# Patient Record
Sex: FEMALE | Race: BLACK OR AFRICAN AMERICAN | NOT HISPANIC OR LATINO | URBAN - METROPOLITAN AREA
[De-identification: names, ages, dates, MRNs, and addresses within clinical notes are randomized per-mention and may not be internally consistent; named-entity substitution may affect disease eponyms.]

---

## 2022-01-18 ENCOUNTER — EMERGENCY (EMERGENCY)
Facility: HOSPITAL | Age: 67
LOS: 1 days | Discharge: HOME | End: 2022-01-18
Attending: STUDENT IN AN ORGANIZED HEALTH CARE EDUCATION/TRAINING PROGRAM | Admitting: STUDENT IN AN ORGANIZED HEALTH CARE EDUCATION/TRAINING PROGRAM
Payer: MEDICARE

## 2022-01-18 VITALS
WEIGHT: 147.93 LBS | TEMPERATURE: 99 F | HEART RATE: 89 BPM | HEIGHT: 60 IN | SYSTOLIC BLOOD PRESSURE: 150 MMHG | OXYGEN SATURATION: 99 % | RESPIRATION RATE: 16 BRPM | DIASTOLIC BLOOD PRESSURE: 87 MMHG

## 2022-01-18 VITALS
HEART RATE: 88 BPM | OXYGEN SATURATION: 100 % | SYSTOLIC BLOOD PRESSURE: 126 MMHG | TEMPERATURE: 98 F | DIASTOLIC BLOOD PRESSURE: 77 MMHG | RESPIRATION RATE: 20 BRPM

## 2022-01-18 LAB — SARS-COV-2 RNA SPEC QL NAA+PROBE: SIGNIFICANT CHANGE UP

## 2022-01-18 PROCEDURE — 99284 EMERGENCY DEPT VISIT MOD MDM: CPT

## 2022-01-18 RX ORDER — ACETAMINOPHEN 500 MG
650 TABLET ORAL ONCE
Refills: 0 | Status: COMPLETED | OUTPATIENT
Start: 2022-01-18 | End: 2022-01-18

## 2022-01-18 RX ADMIN — Medication 650 MILLIGRAM(S): at 14:40

## 2022-01-18 NOTE — ED ADULT NURSE NOTE - NSIMPLEMENTINTERV_GEN_ALL_ED
Implemented All Universal Safety Interventions:  [place] to call system. Call bell, personal items and telephone within reach. Instruct patient to call for assistance. Room bathroom lighting operational. Non-slip footwear when patient is off stretcher. Physically safe environment: no spills, clutter or unnecessary equipment. Stretcher in lowest position, wheels locked, appropriate side rails in place.

## 2022-01-18 NOTE — ED PROVIDER NOTE - ATTENDING CONTRIBUTION TO CARE
I performed a face-to-face evaluation of the patient and performed a history and physical examination. I agree with the history and physical examination. If this was a PA visit, I personally saw the patient with the PA and performed a substantive portion of the visit including all aspects of the medical decision making.    65yo F presents for possible covid exposure. pt states her daughter had a fever and is being seen in ED so wants a swab. also complains of headache on right side since yesterday, has had similar headaches in past, no assoc fevres, chills, nausea, ovmiting, vision changes, not worst headache or sudden onset  pt well appearing, neuro inact, no facial droop. motor and sensory intactr, normal gait,  will give tylenol, covid swab, dc

## 2022-01-18 NOTE — ED PROVIDER NOTE - NSFOLLOWUPINSTRUCTIONS_ED_ALL_ED_FT
There are no signs of emergency conditions requiring admission to the hospital on today's workup.  Based on the evaluation, a presumptive diagnosis was made, however, further evaluation may be required by your primary care physician or a specialist for a more definitive diagnosis.  Therefore, please follow-up as directed or return to the Emergency Department if your symptoms change or worsen.    We recommend that you:  1. See your primary care physician within the next 72 hours for follow up.  Bring a copy of your discharge paperwork (including any test results) to your doctor.  2. Please take 675mg of Tylenol every 6-8 hours as needed for pain, with food.   3. Please isolate until COVID results return if positive isolate fort 5 days from positive test.  4. Please maintain hydration and have healthy meals.       *** Return immediately if you have worsening symptoms, chest pain, Shortness of Breath, abdominal pain, Nausea/Vomiting/Diarrhea, dizziness, weakness, confusion, severe headache, vision changes, urinary symptoms, syncope, falls, trauma, discharge, bleeding, fevers, or any other new/concerning symptoms. *** There are no signs of emergency conditions requiring admission to the hospital on today's workup.  Based on the evaluation, a presumptive diagnosis was made, however, further evaluation may be required by your primary care physician or a specialist for a more definitive diagnosis.  Therefore, please follow-up as directed or return to the Emergency Department if your symptoms change or worsen.    We recommend that you:  1. See your primary care physician within the next 72 hours for follow up.  Bring a copy of your discharge paperwork (including any test results) to your doctor.  2. Please take 675mg of Tylenol every 6-8 hours as needed for pain, with food.   3. Please isolate until COVID results return if positive isolate fort 5 days from positive test.  4. Please maintain hydration and have healthy meals.   5. You will receive a text message with your COVID results.       *** Return immediately if you have worsening symptoms, chest pain, Shortness of Breath, abdominal pain, Nausea/Vomiting/Diarrhea, dizziness, weakness, confusion, severe headache, vision changes, urinary symptoms, syncope, falls, trauma, discharge, bleeding, fevers, or any other new/concerning symptoms. ***

## 2022-01-18 NOTE — ED PROVIDER NOTE - PATIENT PORTAL LINK FT
You can access the FollowMyHealth Patient Portal offered by [facility] by registering at the following website: [email or web address]. By joining [facility]’s FollowMyHealth portal, you will also be able to view your health information using other applications (apps) compatible with our system.

## 2022-01-18 NOTE — ED PROVIDER NOTE - PROGRESS NOTE DETAILS
Pt reassessed at bedside, feels well, pain controlled. Informed of workup in ED, reviewed lab and/or radiology results (when applicable) with patient/caregiver. Informed pt of plan for discharge with instructions to follow up with PMD. Pt/caregiver expressed understanding of plan and agrees with plan for discharge. Strict return precautions discussed with patient in layman's terms, patient demonstrated understanding of return precautions. MD aware and agrees. Srinath Blanco PA-C

## 2022-01-18 NOTE — ED PROVIDER NOTE - OBJECTIVE STATEMENT
66 year old FM with no pmhx presents to the ED for possible covid exposure. patient reports daughter had a fever and is being seen in ED so she wants a COVID swab.  patient also endorsing mild headache on right side since yesterday, has had similar headaches in past. patient denies chest pain, Shortness of Breath, abdominal pain, Nausea/Vomiting/Diarrhea, dizziness, weakness, confusion, vision changes, urinary symptoms, syncope, falls, trauma, discharge, bleeding, fevers

## 2022-01-18 NOTE — ED ADULT NURSE NOTE - OBJECTIVE STATEMENT
Patient is a 67 yo female, denies PMH, presenting after possible COVID exposure. Patient stated her daughter was not feeling well and she would like a COVID test. Patient c/o 3/10 headache, administered tylenol per MD orders. Patient AAOx4, with no other complaints. Patient educated on fall precautions. COVID swab sent per orders.

## 2022-01-18 NOTE — ED PROVIDER NOTE - NS ED ROS FT
Review of Systems:  · Constitutional: no chills, no fever, no night sweats, no weight loss  · Nose: no epistaxis  · Mouth/Throat: no difficulty in swallowing, trachea midline, uvula midline  . Cardio: No CP, no palpitations, no chest pressure, no ripping chest pain  · Respiratory: no cough, no exertional dyspnea, no hemoptysis, no orthopnea, no shortness of breath  · Gastrointestinal: no abdominal pain, no diarrhea, no melena, no nausea, no vomiting  · Genitourinary: no difficulty urinating, no dysuria, no hematuria  · MUSCULOSKELETAL: FROM of all extremities  · Skin: no abrasion; no bruising; no laceration  · Neurological: headache, no change in level of consciousness, no seizures  · ROS STATEMENT: all other ROS negative except as per HPI

## 2022-01-18 NOTE — ED PROVIDER NOTE - CLINICAL SUMMARY MEDICAL DECISION MAKING FREE TEXT BOX
66 fm pw possible covid exposure and HA, will give pain meds, COVID PCR, reassess, likely DC with close PCP f/u.

## 2023-06-26 ENCOUNTER — OFFICE (OUTPATIENT)
Dept: URBAN - METROPOLITAN AREA CLINIC 76 | Facility: CLINIC | Age: 68
Setting detail: OPHTHALMOLOGY
End: 2023-06-26
Payer: MEDICARE

## 2023-06-26 DIAGNOSIS — H25.13: ICD-10-CM

## 2023-06-26 DIAGNOSIS — H16.223: ICD-10-CM

## 2023-06-26 DIAGNOSIS — E11.9: ICD-10-CM

## 2023-06-26 DIAGNOSIS — H43.813: ICD-10-CM

## 2023-06-26 DIAGNOSIS — H35.363: ICD-10-CM

## 2023-06-26 PROCEDURE — 92134 CPTRZ OPH DX IMG PST SGM RTA: CPT | Performed by: OPHTHALMOLOGY

## 2023-06-26 PROCEDURE — 92014 COMPRE OPH EXAM EST PT 1/>: CPT | Performed by: OPHTHALMOLOGY

## 2023-06-26 ASSESSMENT — REFRACTION_MANIFEST
OD_CYLINDER: -1.00
OD_ADD: +2.50
OS_SPHERE: -0.50
OD_AXIS: 110
OS_AXIS: 50
OS_SPHERE: -0.50
OS_ADD: +2.50
OD_VA1: 20/30
OD_ADD: +2.50
OD_SPHERE: PLANO
OS_AXIS: 69
OD_AXIS: 98
OS_ADD: +2.50
OS_VA1: 20/40
OD_SPHERE: +0.25
OD_CYLINDER: -1.00
OS_CYLINDER: -0.50
OS_CYLINDER: -1.00
OS_VA1: 20/40
OD_VA1: 20/30

## 2023-06-26 ASSESSMENT — AXIALLENGTH_DERIVED
OD_AL: 23.3782
OS_AL: 23.2465
OS_AL: 23.4371
OS_AL: 23.3414
OD_AL: 23.2356

## 2023-06-26 ASSESSMENT — CONFRONTATIONAL VISUAL FIELD TEST (CVF)
OS_FINDINGS: FULL
OD_FINDINGS: FULL

## 2023-06-26 ASSESSMENT — SPHEQUIV_DERIVED
OS_SPHEQUIV: -0.75
OD_SPHEQUIV: -0.25
OS_SPHEQUIV: -1.25
OS_SPHEQUIV: -1
OD_SPHEQUIV: -0.625

## 2023-06-26 ASSESSMENT — VISUAL ACUITY
OD_BCVA: 20/100
OS_BCVA: 20/40

## 2023-06-26 ASSESSMENT — TEAR BREAK UP TIME (TBUT)
OD_TBUT: 1+
OS_TBUT: 1+

## 2023-06-26 ASSESSMENT — KERATOMETRY
OD_K2POWER_DIOPTERS: 45.00
OD_AXISANGLE_DEGREES: 39
OS_K2POWER_DIOPTERS: 45.50
OS_AXISANGLE_DEGREES: 132
OS_K1POWER_DIOPTERS: 45.00
OD_K1POWER_DIOPTERS: 44.50

## 2023-06-26 ASSESSMENT — TONOMETRY
OD_IOP_MMHG: 18
OS_IOP_MMHG: 17

## 2023-06-26 ASSESSMENT — REFRACTION_AUTOREFRACTION
OD_CYLINDER: -1.25
OS_AXIS: 69
OS_SPHERE: -0.75
OS_CYLINDER: -1.00
OD_AXIS: 98
OD_SPHERE: 0.00

## 2023-06-26 ASSESSMENT — SUPERFICIAL PUNCTATE KERATITIS (SPK)
OD_SPK: 1+
OS_SPK: 1+

## 2024-02-01 ENCOUNTER — OFFICE (OUTPATIENT)
Dept: URBAN - METROPOLITAN AREA CLINIC 76 | Facility: CLINIC | Age: 69
Setting detail: OPHTHALMOLOGY
End: 2024-02-01
Payer: MEDICARE

## 2024-02-01 DIAGNOSIS — H25.13: ICD-10-CM

## 2024-02-01 DIAGNOSIS — E11.9: ICD-10-CM

## 2024-02-01 DIAGNOSIS — H16.223: ICD-10-CM

## 2024-02-01 DIAGNOSIS — H43.813: ICD-10-CM

## 2024-02-01 DIAGNOSIS — H35.033: ICD-10-CM

## 2024-02-01 DIAGNOSIS — H35.363: ICD-10-CM

## 2024-02-01 PROCEDURE — 92250 FUNDUS PHOTOGRAPHY W/I&R: CPT | Performed by: OPTOMETRIST

## 2024-02-01 PROCEDURE — 92014 COMPRE OPH EXAM EST PT 1/>: CPT | Performed by: OPTOMETRIST

## 2024-02-01 ASSESSMENT — REFRACTION_MANIFEST
OD_SPHERE: PLANO
OS_VA1: 20/30
OD_SPHERE: +0.25
OD_ADD: +2.50
OD_CYLINDER: -1.00
OD_AXIS: 98
OS_SPHERE: -0.50
OS_ADD: +2.50
OD_CYLINDER: -1.00
OS_AXIS: 075
OS_VA1: 20/40
OS_AXIS: 50
OD_VA1: 20/30
OS_ADD: +2.50
OS_VA1: 20/40
OD_VA1: 20/30
OD_CYLINDER: -1.00
OS_SPHERE: -0.50
OS_SPHERE: -0.50
OD_SPHERE: PLANO
OS_CYLINDER: -1.25
OS_AXIS: 69
OD_AXIS: 110
OD_ADD: +2.50
OD_VA1: 20/30
OD_AXIS: 105
OS_CYLINDER: -1.00
OS_CYLINDER: -0.50

## 2024-02-01 ASSESSMENT — REFRACTION_AUTOREFRACTION
OS_CYLINDER: -1.00
OD_SPHERE: 0.00
OD_CYLINDER: -1.25
OD_AXIS: 98
OS_SPHERE: -0.75
OS_AXIS: 69

## 2024-02-01 ASSESSMENT — SPHEQUIV_DERIVED
OS_SPHEQUIV: -1.125
OS_SPHEQUIV: -1.25
OS_SPHEQUIV: -1
OD_SPHEQUIV: -0.625
OS_SPHEQUIV: -0.75
OD_SPHEQUIV: -0.25

## 2024-02-01 ASSESSMENT — TEAR BREAK UP TIME (TBUT)
OD_TBUT: 1+
OS_TBUT: 1+

## 2024-02-01 ASSESSMENT — CONFRONTATIONAL VISUAL FIELD TEST (CVF)
OS_FINDINGS: FULL
OD_FINDINGS: FULL

## 2024-02-01 ASSESSMENT — SUPERFICIAL PUNCTATE KERATITIS (SPK)
OD_SPK: T
OS_SPK: T